# Patient Record
Sex: FEMALE | Race: WHITE | URBAN - METROPOLITAN AREA
[De-identification: names, ages, dates, MRNs, and addresses within clinical notes are randomized per-mention and may not be internally consistent; named-entity substitution may affect disease eponyms.]

---

## 2018-11-12 ENCOUNTER — APPOINTMENT (OUTPATIENT)
Dept: URBAN - METROPOLITAN AREA SURGERY 18 | Age: 78
Setting detail: DERMATOLOGY
End: 2018-11-14

## 2018-11-12 VITALS — DIASTOLIC BLOOD PRESSURE: 61 MMHG | HEART RATE: 74 BPM | SYSTOLIC BLOOD PRESSURE: 109 MMHG

## 2018-11-12 PROBLEM — Z85.828 PERSONAL HISTORY OF OTHER MALIGNANT NEOPLASM OF SKIN: Status: ACTIVE | Noted: 2018-11-12

## 2018-11-12 PROBLEM — D04.39 CARCINOMA IN SITU OF SKIN OF OTHER PARTS OF FACE: Status: ACTIVE | Noted: 2018-11-12

## 2018-11-12 PROCEDURE — 17311 MOHS 1 STAGE H/N/HF/G: CPT

## 2018-11-12 PROCEDURE — OTHER MOHS SURGERY: OTHER

## 2018-11-12 PROCEDURE — OTHER REFERRAL CORRESPONDENCE: OTHER

## 2018-11-12 PROCEDURE — 13132 CMPLX RPR F/C/C/M/N/AX/G/H/F: CPT

## 2018-11-12 PROCEDURE — 17312 MOHS ADDL STAGE: CPT

## 2018-11-12 NOTE — PROCEDURE: MOHS SURGERY
Stage 7: Additional Anesthesia Type: 1% lidocaine with 1:100,000 epinephrine and 408mcg clindamycin/ml and a 1:10 solution of 8.4% sodium bicarbonate

## 2018-12-17 ENCOUNTER — APPOINTMENT (OUTPATIENT)
Dept: URBAN - METROPOLITAN AREA SURGERY 18 | Age: 78
Setting detail: DERMATOLOGY
End: 2018-12-17

## 2018-12-17 DIAGNOSIS — Z48.817 ENCOUNTER FOR SURGICAL AFTERCARE FOLLOWING SURGERY ON THE SKIN AND SUBCUTANEOUS TISSUE: ICD-10-CM

## 2018-12-17 PROBLEM — I10 ESSENTIAL (PRIMARY) HYPERTENSION: Status: ACTIVE | Noted: 2018-12-17

## 2018-12-17 PROBLEM — H91.90 UNSPECIFIED HEARING LOSS, UNSPECIFIED EAR: Status: ACTIVE | Noted: 2018-12-17

## 2018-12-17 PROCEDURE — 99024 POSTOP FOLLOW-UP VISIT: CPT

## 2018-12-17 PROCEDURE — OTHER POST-OP WOUND CHECK: OTHER

## 2018-12-17 ASSESSMENT — LOCATION ZONE DERM: LOCATION ZONE: FACE

## 2018-12-17 ASSESSMENT — LOCATION DETAILED DESCRIPTION DERM: LOCATION DETAILED: RIGHT LATERAL MANDIBULAR CHEEK

## 2018-12-17 ASSESSMENT — LOCATION SIMPLE DESCRIPTION DERM: LOCATION SIMPLE: RIGHT CHEEK

## 2018-12-17 NOTE — PROCEDURE: POST-OP WOUND CHECK
Add 75757 Cpt? (Important Note: In 2017 The Use Of 09306 Is Being Tracked By Cms To Determine Future Global Period Reimbursement For Global Periods): yes

## 2024-07-07 NOTE — PROCEDURE: MOHS SURGERY
, Home Suture Removal Text: Patient was provided instructions on removing sutures and will remove their sutures at home.  If they have any questions or difficulties they will call the office.